# Patient Record
Sex: MALE | Race: WHITE | Employment: FULL TIME | URBAN - METROPOLITAN AREA
[De-identification: names, ages, dates, MRNs, and addresses within clinical notes are randomized per-mention and may not be internally consistent; named-entity substitution may affect disease eponyms.]

---

## 2021-08-09 ENCOUNTER — HOSPITAL ENCOUNTER (OUTPATIENT)
Dept: PHYSICAL THERAPY | Age: 31
Discharge: HOME | End: 2021-08-09
Payer: COMMERCIAL

## 2021-08-09 PROCEDURE — 97161 PT EVAL LOW COMPLEX 20 MIN: CPT

## 2021-08-09 PROCEDURE — 97140 MANUAL THERAPY 1/> REGIONS: CPT

## 2021-08-09 PROCEDURE — 97535 SELF CARE MNGMENT TRAINING: CPT

## 2021-08-09 NOTE — PROGRESS NOTES
[facility] PHYSICAL THERAPY  [address] [place] [address] [address],[place], [facility] - Phone: [phone]  Fax: [phone] / 7543 [facility]  Patient Name: Jayla Fonseca : 1990   Medical   Diagnosis: Right ankle pain [M25.571] Treatment Diagnosis: Right Ankle Sprain   Onset Date: 2021     Referral Source: Patricia Sinha MD Start of Care [facility]): 2021   Prior Hospitalization: See medical history Provider #: [id number]   Prior Level of Function: No hx ankle sprain/pain with sports   Comorbidities: None reported   Medications: Verified on Patient Summary List   The Plan of Care and following information is based on the information from the initial evaluation.   ===========================================================================================  Assessment / matias information:  Jayla Fonseca is a 33 y.o.  yo male with Dx of Right ankle pain [M25.571]. Patient reports onset of symptoms of right ankle sprain while playing kickball. Patient reports sprain of ATFL, CFL and tendinitis of posterior tibialis. Xrays negative, no MRI to assess soft tissue damage. Patient currently rates pain as 8/10 at worst, 2/10 at best, primarily located at medial, lateral, dorsal and plantar right foot and ankle. Patient did not wear CAM boot. Currently donning right ankle brace with medial and lateral stays. Patient complains of difficulty and increase pain with running, sprinting and lateral movements. Patient goals to return to competitive kickball. Objective Findings:  Right ankle AROM: DF=-5 degrees, PF 56 degrees, IVR=14 degrees, EVR=25, hallux DF 60 degrees, hallux PF=20 degrees . Manual Muscle Testin/5 right ankle strength. Special Test: TTP right post tib. Decreased mobility midfoot, talocrural joint and lateral malleolus. Pain with repeated resisted inversion. FOTO Score= 61 points.   Patient encouraged to discontinue brace use with ADLs without pain. Pt instructed in HEP and will f/u in clinic for PT.  ===========================================================================================  Eval Complexity: History HIGH Complexity :3+ comorbidities / personal factors will impact the outcome/ POC ;  Examination  HIGH Complexity : 4+ Standardized tests and measures addressing body structure, function, activity limitation and / or participation in recreation ; Presentation LOW Complexity : Stable, uncomplicated ;  Decision Making MEDIUM Complexity : FOTO score of 26-74; Overall Complexity LOW   Problem List: pain affecting function, decrease ROM, decrease strength, edema affecting function, impaired gait/ balance and decrease flexibility/ joint mobility   Treatment Plan may include any combination of the following: Therapeutic exercise, Therapeutic activities, Neuromuscular re-education, Physical agent/modality, Gait/balance training, Manual therapy and Patient education  Patient / Family readiness to learn indicated by: asking questions, trying to perform skills and interest  Persons(s) to be included in education: patient (P)  Barriers to Learning/Limitations: None  Measures taken, if barriers to learning:    Patient Goal (s): \"Get back to playing sport full mobility, full speed. \"   Patient self reported health status: excellent  Rehabilitation Potential: good   Short Term Goals: To be accomplished in  4  weeks:  1. Patient will increase FOTO Score to 72 points to improve tolerance to running. 2. Patient will achieve +2 on GROC to improve tolerance to running. 3. Patient will report 2/10 pain at worst to improve tolerance to running.  Long Term Goals: To be accomplished in  8  weeks:  1. Patient will increase FOTO Score to 83 points to return to kickball. 2. Patient will achieve full AROM right ankle to return to kickball.   3. Patient will report no pain with running, sprinting and cutting movements to return to kickball. Frequency / Duration:   Patient to be seen  2  times per week for 6-8  weeks:  Patient / Caregiver education and instruction: exercises  Therapist Signature: Sohan Chamberlain PT Date: 4/2/6387   Certification Period: n/a Time: 4:26 PM   ===========================================================================================  I certify that the above Physical Therapy Services are being furnished while the patient is under my care. I agree with the treatment plan and certify that this therapy is necessary. Physician Signature:        Date:       Time:                                        Brock Gusman MD  Please sign and return to [facility]. or you may fax the signed copy to [phone]. Thank you.

## 2021-08-09 NOTE — PROGRESS NOTES
PHYSICAL THERAPY - DAILY TREATMENT NOTE      Patient Name: Dayne Camrona        Date: 2021  : 1990   YES Patient  Verified  Visit #:     Insurance: Payor: Gayle Clemente / Plan: Mari Goodwin / Product Type: HMO /      In time: 3:35 Out time: 4:20   Total Treatment Time: 45     Medicare Time/BCBS Tracking (below)   Total Timed Codes (min):  25 1:1 Treatment Time:  25     TREATMENT AREA = Right ankle pain [M25.571]     SUBJECTIVE    Pain Level (on 0 to 10 scale):  4  / 10   Medication Changes/New allergies or changes in medical history, any new surgeries or procedures?     NO    If yes, update Summary List   Subjective Functional Status/Changes:  []  No changes reported       See Plan of Care       OBJECTIVE  Modalities Rationale:     decrease pain to improve patient's ability to walk   min [] Estim, type/location:                                      []  att     []  unatt     []  w/US     []  w/ice    []  w/heat    min []  Mechanical Traction: type/lbs                   []  pro   []  sup   []  int   []  cont    []  before manual    []  after manual    min []  Ultrasound, settings/location:      min []  Iontophoresis w/ dexamethasone, location:                                               []  take home patch       []  in clinic   10 min [x]  Ice     []  Heat    location/position: Supine right ankle    min []  Vasopneumatic Device, press/temp:        min []  Other:    [x] Skin assessment post-treatment (if applicable):    []  intact    [x]  redness- no adverse reaction                  []redness  adverse reaction:      10 min Therapeutic Exercise:  [x]  See flow sheet   Rationale:      increase ROM and increase strength to improve the patients ability to walk     15 min Manual Therapy: Technique:      [] S/DTM []IASTM []PROM [] Passive Stretching   []manual TPR    []Jt manipulation:Gr I [] II []  III [] IV[] V[]  Treatment Area:  Grade IV joint mobs mid foot, talocrural, post glides right lateral malleolus; DF and PF PROM   Rationale:      decrease pain, increase ROM and increase tissue extensibility to improve patient's ability to walk    Billed With/As:   [x] TE   [] TA   [] Neuro   [] Self Care Patient Education: [x] Review HEP    [] Progressed/Changed HEP based on:   [] positioning   [] body mechanics   [] transfers   [] heat/ice application    [] other:      Other Objective/Functional Measures:    See Plan of Care     Post Treatment Pain Level (on 0 to 10) scale:   4 / 10     ASSESSMENT    Assessment/Changes in Function:     See Plan of Care     []  See Progress Note/Recertification   Patient will continue to benefit from skilled PT services to modify and progress therapeutic interventions, address functional mobility deficits, address ROM deficits, address strength deficits, analyze and address soft tissue restrictions, analyze and cue movement patterns, analyze and modify body mechanics/ergonomics and assess and modify postural abnormalities to attain remaining goals. to attain remaining goals.    Progress toward goals / Updated goals:    See Plan of Care     PLAN    [x]  Upgrade activities as tolerated YES Continue plan of care   []  Discharge due to :    []  Other:      Therapist: Rebeca Stevens PT    Date: 8/9/2021 Time: 4:30 PM     Future Appointments   Date Time Provider Bairon Eubanks   8/12/2021  8:30 AM LUCIEN Jolly [id number]   8/18/2021 11:15 AM [facility] [address] [facility]   8/23/2021  1:00 PM [address] [facility]   8/25/2021 11:15 AM [facility] [address] [place] [facility]   8/31/2021  1:45 PM LUCIEN Jolly [address]

## 2021-08-12 ENCOUNTER — HOSPITAL ENCOUNTER (OUTPATIENT)
Dept: PHYSICAL THERAPY | Age: 31
Discharge: HOME | End: 2021-08-12
Payer: COMMERCIAL

## 2021-08-12 PROCEDURE — 97140 MANUAL THERAPY 1/> REGIONS: CPT

## 2021-08-12 PROCEDURE — 97110 THERAPEUTIC EXERCISES: CPT

## 2021-08-12 NOTE — PROGRESS NOTES
PHYSICAL THERAPY - DAILY TREATMENT NOTE    Patient Name: Keaton Osorio        Date: 2021  : 1990   yes Patient  Verified  Visit #:      18  Insurance: Payor: Cherry Hatchet / Plan: Fredy Lara / Product Type: HMO /      In time: 830 Out time: 690   Total Treatment Time: 51     Medicare/BCBS Time Tracking (below)   Total Timed Codes (min):  41 1:1 Treatment Time:  41     TREATMENT AREA =  Right ankle pain [M25.571]    SUBJECTIVE  Pain Level (on 0 to 10 scale):  4  / 10   Medication Changes/New allergies or changes in medical history, any new surgeries or procedures?    no  If yes, update Summary List   Subjective Functional Status/Changes:  []  No changes reported     Patient reports his ankle always feels worse first thing in the morning. Patient states he did his home exercises from the IE without complication. OBJECTIVE  Modalities Rationale:     decrease edema, decrease inflammation and decrease pain to improve patient's ability to perform transfers.     min [] Estim, type/location:                                      []  att     []  unatt     []  w/US     []  w/ice    []  w/heat    min []  Mechanical Traction: type/lbs                   []  pro   []  sup   []  int   []  cont    []  before manual    []  after manual    min []  Ultrasound, settings/location:      min []  Iontophoresis w/ dexamethasone, location:                                               []  take home patch       []  in clinic   10 min [x]  Ice     []  Heat    location/position:     min []  Vasopneumatic Device, press/temp:    If using vaso (only need to measure limb vaso being performed on)      pre-treatment girth :       post-treatment girth :       measured at (landmark location) :      min []  Other:    [] Skin assessment post-treatment (if applicable):    []  intact    []  redness- no adverse reaction                  []redness  adverse reaction:      29 min Therapeutic Exercise:  [x]  See flow sheet Rationale:      increase ROM, increase strength, improve coordination and improve balance to improve the patients ability to perform walking activities. 12 min Manual Therapy: STM to R posterior tib, R mid foot mobs, R talus mobs; R ankle DF stretch   Rationale:      decrease pain, increase ROM, increase tissue extensibility and decrease trigger points to improve patient's ability to perform standing activities. The manual therapy interventions were performed at a separate and distinct time from the therapeutic activities interventions. Billed With/As:   [x] TE   [] TA   [] Neuro   [] Self Care Patient Education: [x] Review HEP    [] Progressed/Changed HEP based on:   [] positioning   [] body mechanics   [] transfers   [] heat/ice application    [] other:      Other Objective/Functional Measures:    Progressed therapy program per flowsheet in order to improve R ankle strength, stability and flexibility  Tenderness noted throughout posterior tib during MT; limited ankle DF noted with passive stretching  Good technique with lateral tap down exercise, appropriate hip hinge mechanics and maintenance of LE alignment   Post Treatment Pain Level (on 0 to 10) scale:   3-4  / 10     ASSESSMENT  Assessment/Changes in Function:     Patient denied significant changes in symptoms post session. Educated on possible increase in soreness and how to respond appropriately. Also educated patient on avoiding running and sprinting activities at this time. []  See Progress Note/Recertification   Patient will continue to benefit from skilled PT services to modify and progress therapeutic interventions, address functional mobility deficits, address ROM deficits, address strength deficits, analyze and address soft tissue restrictions, analyze and cue movement patterns, analyze and modify body mechanics/ergonomics and assess and modify postural abnormalities to attain remaining goals.    Progress toward goals / Updated goals:     No progress with STG#3 at this time     PLAN  [x]  Upgrade activities as tolerated yes Continue plan of care   []  Discharge due to :    []  Other:      Therapist: Jose Hurst PT    Date: 8/12/2021 Time: 11:14 AM     Future Appointments   Date Time Provider Bairon Eubanks   8/18/2021 11:15 AM [address] [place] [address] [facility]   8/23/2021  1:00 PM 1000 [facility] Se [address] [place] [address] [facility]   8/25/2021 11:15 AM [facility] PT [facility]   8/31/2021  1:45 PM Louisa Painting, PT [address]

## 2021-08-18 ENCOUNTER — HOSPITAL ENCOUNTER (OUTPATIENT)
Dept: PHYSICAL THERAPY | Age: 31
Discharge: HOME | End: 2021-08-18
Payer: COMMERCIAL

## 2021-08-18 PROCEDURE — 97140 MANUAL THERAPY 1/> REGIONS: CPT

## 2021-08-18 PROCEDURE — 97110 THERAPEUTIC EXERCISES: CPT

## 2021-08-18 NOTE — PROGRESS NOTES
PHYSICAL THERAPY - DAILY TREATMENT NOTE      Patient Name: Kristie Coy        Date: 2021  : 1990   YES Patient  Verified  Visit #:   3   of   12  Insurance: Payor: Aleksandar Arzate / Plan: Carolyn Fee / Product Type: HMO /      In time: 11:15 Out time: 12:10   Total Treatment Time: 55     Medicare/BCBS Time Tracking (below)   Total Timed Codes (min):  45 1:1 Treatment Time:  45     TREATMENT AREA =  Right ankle pain [M25.571]    SUBJECTIVE    Pain Level (on 0 to 10 scale):  4  / 10   Medication Changes/New allergies or changes in medical history, any new surgeries or procedures?     NO    If yes, update Summary List   Subjective Functional Status/Changes:  []  No changes reported       Functional improvements: none reported  Functional impairments: running, cutting         OBJECTIVE  Modalities Rationale:     decrease pain to improve patient's ability to run   min [] Estim, type/location:                                      []  att     []  unatt     []  w/US     []  w/ice    []  w/heat    min []  Mechanical Traction: type/lbs                   []  pro   []  sup   []  int   []  cont    []  before manual    []  after manual    min []  Ultrasound, settings/location:      min []  Iontophoresis w/ dexamethasone, location:                                               []  take home patch       []  in clinic   10 min [x]  Ice     []  Heat    location/position: Supine right ankle    min []  Vasopneumatic Device, press/temp:        min []  Other:    [x] Skin assessment post-treatment (if applicable):    []  intact    [x]  redness- no adverse reaction                  []redness  adverse reaction:      30 min Therapeutic Exercise:  [x]  See flow sheet   Rationale:      increase ROM and increase strength to improve the patients ability to run     15 min Manual Therapy: Technique:      [] S/DTM []IASTM []PROM [] Passive Stretching   []manual TPR    []Jt manipulation:Gr I [] II []  III [] IV[] V[]  Treatment Area:  Right ankle effleurage to reduce edema, DF mobs grade IV; midfoot mobs grade IV   Rationale:      decrease pain, increase ROM and increase tissue extensibility to improve patient's ability to run    Billed With/As:   [x] TE   [] TA   [] Neuro   [] Self Care Patient Education: [x] Review HEP    [] Progressed/Changed HEP based on:   [] positioning   [] body mechanics   [] transfers   [] heat/ice application    [] other:      Other Objective/Functional Measures:    Continuing to focus on low pain therex to improve ankle proprioception, coordination and stability. Post Treatment Pain Level (on 0 to 10) scale:   3  / 10     ASSESSMENT    Assessment/Changes in Function:     Patient reported increased right ankle pain and edema after kickball game on 8/16/21. Reviewed recommendations to avoid exacerbating activities to promote injury prevention and improved tissues healing. []  See Progress Note/Recertification   Patient will continue to benefit from skilled PT services to modify and progress therapeutic interventions, address functional mobility deficits, address ROM deficits, address strength deficits, analyze and address soft tissue restrictions, analyze and cue movement patterns, analyze and modify body mechanics/ergonomics and assess and modify postural abnormalities to attain remaining goals. to attain remaining goals. Progress toward goals / Updated goals:    Fair Progress to    [x] STG    [] LTG  3 as shown by pain 4/10.      PLAN    [x]  Upgrade activities as tolerated YES Continue plan of care   []  Discharge due to :    []  Other:      Therapist: Aram Montelongo, PT    Date: 8/18/2021 Time: 11:49 AM     Future Appointments   Date Time Provider Bairon Eubanks   8/23/2021  1:00 PM 1000 [facility] 3 [facility]   8/25/2021 11:15 AM [facility] PT [facility] 3 [address]   8/31/2021  1:45 PM Hannah Fonseca PT [address]

## 2021-08-23 ENCOUNTER — HOSPITAL ENCOUNTER (OUTPATIENT)
Dept: PHYSICAL THERAPY | Age: 31
Discharge: HOME | End: 2021-08-23
Payer: COMMERCIAL

## 2021-08-23 PROCEDURE — 97110 THERAPEUTIC EXERCISES: CPT

## 2021-08-23 PROCEDURE — 97140 MANUAL THERAPY 1/> REGIONS: CPT

## 2021-08-23 NOTE — PROGRESS NOTES
PHYSICAL THERAPY - DAILY TREATMENT NOTE      Patient Name: Aryan Vu        Date: 2021  : 1990   YES Patient  Verified  Visit #:     Insurance: Payor: Alexis Ritchie / Plan: José Luis Feldman / Product Type: HMO /      In time: 1:00 Out time: 2:00   Total Treatment Time: 60     Medicare/[facility] Time Tracking (below)   Total Timed Codes (min):  50 1:1 Treatment Time:  50     TREATMENT AREA =  Right ankle pain [M25.571]    SUBJECTIVE    Pain Level (on 0 to 10 scale):  2  / 10   Medication Changes/New allergies or changes in medical history, any new surgeries or procedures?     NO    If yes, update Summary List   Subjective Functional Status/Changes:  []  No changes reported       Functional improvements: less pain walking  Functional impairments: running, kickball         OBJECTIVE  Modalities Rationale:     decrease pain to improve patient's ability to run   min [] Estim, type/location:                                      []  att     []  unatt     []  w/US     []  w/ice    []  w/heat    min []  Mechanical Traction: type/lbs                   []  pro   []  sup   []  int   []  cont    []  before manual    []  after manual    min []  Ultrasound, settings/location:      min []  Iontophoresis w/ dexamethasone, location:                                               []  take home patch       []  in clinic   10 min [x]  Ice     []  Heat    location/position: Supine right ankle    min []  Vasopneumatic Device, press/temp:        min []  Other:    [] Skin assessment post-treatment (if applicable):    []  intact    []  redness- no adverse reaction                  []redness  adverse reaction:      35 min Therapeutic Exercise:  [x]  See flow sheet   Rationale:      increase ROM, increase strength, improve coordination, improve balance and increase proprioception to improve the patients ability to run     15 min Manual Therapy: Technique:      [] S/DTM []IASTM []PROM [] Passive Stretching   []manual TPR    []Jt manipulation:Gr I [] II []  III [] IV[] V[]  Treatment Area:  effleruage right ankle, DF and midfoot mobs grade IV, post glide lat malleolus, DTM proximal post tib   Rationale:      decrease pain, increase ROM and increase tissue extensibility to improve patient's ability to run    Billed With/As:   [x] TE   [] TA   [] Neuro   [] Self Care Patient Education: [x] Review HEP    [] Progressed/Changed HEP based on:   [] positioning   [] body mechanics   [] transfers   [] heat/ice application    [] other:      Other Objective/Functional Measures:    Patient able to tolerated therex progression without increased pain, however unbale to squat pain free and with equal weightbearing due to limited DF ROM/pain. Post Treatment Pain Level (on 0 to 10) scale:   2  / 10     ASSESSMENT    Assessment/Changes in Function:     Patient continues to report      []  See Progress Note/Recertification   Patient will continue to benefit from skilled PT services to modify and progress therapeutic interventions, address functional mobility deficits, address ROM deficits, address strength deficits, analyze and address soft tissue restrictions, analyze and cue movement patterns, analyze and modify body mechanics/ergonomics and assess and modify postural abnormalities to attain remaining goals. to attain remaining goals. Progress toward goals / Updated goals:    Fair Progress to    [x] STG    [] LTG  3 as shown by pain 2/10.      PLAN    [x]  Upgrade activities as tolerated YES Continue plan of care   []  Discharge due to :    []  Other:      Therapist: Valeire Negrete, PT    Date: 8/23/2021 Time: 1:29 PM     Future Appointments   Date Time Provider Bairon Eubanks   8/25/2021 11:15 AM [address] [facility]   8/31/2021  1:45 PM Fabiola Stoner PT [facility]

## 2021-08-25 ENCOUNTER — HOSPITAL ENCOUNTER (OUTPATIENT)
Dept: PHYSICAL THERAPY | Age: 31
Discharge: HOME | End: 2021-08-25
Payer: COMMERCIAL

## 2021-08-25 PROCEDURE — 97140 MANUAL THERAPY 1/> REGIONS: CPT

## 2021-08-25 PROCEDURE — 97110 THERAPEUTIC EXERCISES: CPT

## 2021-08-25 NOTE — PROGRESS NOTES
PHYSICAL THERAPY - DAILY TREATMENT NOTE      Patient Name: Ivelisse Porter        Date: 2021  : 1990   YES Patient  Verified  Visit #:     Insurance: Payor: Leah Chase / Plan: Allen Carbon / Product Type: HMO /      In time: 11:15 Out time: 12:15   Total Treatment Time: 60     Medicare/[facility] Time Tracking (below)   Total Timed Codes (min):  50 1:1 Treatment Time:  50     TREATMENT AREA =  Right ankle pain [M25.571]    SUBJECTIVE    Pain Level (on 0 to 10 scale):  4  / 10   Medication Changes/New allergies or changes in medical history, any new surgeries or procedures?     NO    If yes, update Summary List   Subjective Functional Status/Changes:  []  No changes reported       Functional improvements: pain free daily activities   Functional impairments: running, kickball         OBJECTIVE  Modalities Rationale:     decrease pain to improve patient's ability to run   min [] Estim, type/location:                                      []  att     []  unatt     []  w/US     []  w/ice    []  w/heat    min []  Mechanical Traction: type/lbs                   []  pro   []  sup   []  int   []  cont    []  before manual    []  after manual    min []  Ultrasound, settings/location:      min []  Iontophoresis w/ dexamethasone, location:                                               []  take home patch       []  in clinic   10 min [x]  Ice     []  Heat    location/position: Supine right ankle    min []  Vasopneumatic Device, press/temp:        min []  Other:    [x] Skin assessment post-treatment (if applicable):    []  intact    [x]  redness- no adverse reaction                  []redness  adverse reaction:      35 min Therapeutic Exercise:  [x]  See flow sheet   Rationale:      increase strength, improve coordination, improve balance and increase proprioception to improve the patients ability to run     15 min Manual Therapy: Technique:      [] S/DTM []IASTM []PROM [] Passive Stretching   []manual TPR    []Jt manipulation:Gr I [] II []  III [] IV[] V[]  Treatment Area:  STM post tib; joint mobs grade IV DF, midfoot   Rationale:      decrease pain, increase ROM and increase tissue extensibility to improve patient's ability to run    Billed With/As:   [] TE   [] TA   [] Neuro   [] Self Care Patient Education: [x] Review HEP    [] Progressed/Changed HEP based on:   [] positioning   [] body mechanics   [] transfers   [] heat/ice application    [] other:      Other Objective/Functional Measures:    Patient reporting increased pain with therex today after playing kickball on 8/23/21. Post Treatment Pain Level (on 0 to 10) scale:   3  / 10     ASSESSMENT    Assessment/Changes in Function:     Continuing to focus on low pain stability exercises to improve right ankle stability. []  See Progress Note/Recertification   Patient will continue to benefit from skilled PT services to modify and progress therapeutic interventions, address functional mobility deficits, address ROM deficits, address strength deficits, analyze and address soft tissue restrictions, analyze and cue movement patterns, analyze and modify body mechanics/ergonomics and assess and modify postural abnormalities to attain remaining goals. to attain remaining goals. Progress toward goals / Updated goals:    Fair Progress to    [x] STG    [] LTG  3 as shown by pain 4/10.      PLAN    [x]  Upgrade activities as tolerated YES Continue plan of care   []  Discharge due to :    []  Other:      Therapist: Young Marcano PT    Date: 8/25/2021 Time: 11:57 AM     Future Appointments   Date Time Provider Bairon Eubanks   8/31/2021  1:45 PM LUCIEN Berger [id number]

## 2021-08-31 ENCOUNTER — HOSPITAL ENCOUNTER (OUTPATIENT)
Dept: PHYSICAL THERAPY | Age: 31
Discharge: HOME | End: 2021-08-31
Payer: COMMERCIAL

## 2021-08-31 PROCEDURE — 97112 NEUROMUSCULAR REEDUCATION: CPT

## 2021-08-31 PROCEDURE — 97110 THERAPEUTIC EXERCISES: CPT

## 2021-08-31 PROCEDURE — 97140 MANUAL THERAPY 1/> REGIONS: CPT

## 2021-08-31 NOTE — PROGRESS NOTES
PHYSICAL THERAPY - DAILY TREATMENT NOTE    Patient Name: Alexandra Barnard        Date: 2021  : 1990   yes Patient  Verified  Visit #:     Insurance: Payor: Darren Roche / Plan: Donna Javier / Product Type: HMO /      In time: 145 Out time: 238   Total Treatment Time: 48     Medicare/[employer] Time Tracking (below)   Total Timed Codes (min):  38 1:1 Treatment Time:  38     TREATMENT AREA =  Right ankle pain [M25.571]    SUBJECTIVE  Pain Level (on 0 to 10 scale):  3  / 10   Medication Changes/New allergies or changes in medical history, any new surgeries or procedures?    no  If yes, update Summary List   Subjective Functional Status/Changes:  []  No changes reported     Patient reports his ankle was sore after playing kickball, but overall hasn't experienced any worsening of symptoms. OBJECTIVE  Modalities Rationale:     decrease inflammation and decrease pain to improve patient's ability to perform transfers.     min [] Estim, type/location:                                      []  att     []  unatt     []  w/US     []  w/ice    []  w/heat    min []  Mechanical Traction: type/lbs                   []  pro   []  sup   []  int   []  cont    []  before manual    []  after manual    min []  Ultrasound, settings/location:      min []  Iontophoresis w/ dexamethasone, location:                                               []  take home patch       []  in clinic   10 min [x]  Ice     []  Heat    location/position: To R ankle in supine with bolster    min []  Vasopneumatic Device, press/temp:    If using vaso (only need to measure limb vaso being performed on)      pre-treatment girth :       post-treatment girth :       measured at (landmark location) :      min []  Other:    [] Skin assessment post-treatment (if applicable):    []  intact    []  redness- no adverse reaction                  []redness  adverse reaction:      18 min Therapeutic Exercise:  [x]  See flow sheet   Rationale: increase ROM and increase strength to improve the patients ability to perform walking activities. 10 min Manual Therapy: STM to R posterior tib, R midfoot mobs, R talar mobs   Rationale:      decrease pain, increase ROM, increase tissue extensibility and decrease trigger points to improve patient's ability to perform standing activities. The manual therapy interventions were performed at a separate and distinct time from the therapeutic activities interventions. 10 min Neuromuscular Re-ed: [x]  See flow sheet   Rationale:    improve coordination, improve balance and increase proprioception to improve the patients ability to perform recreational activities. Billed With/As:   [x] TE   [] TA   [] Neuro   [] Self Care Patient Education: [x] Review HEP    [] Progressed/Changed HEP based on:   [] positioning   [] body mechanics   [] transfers   [] heat/ice application    [] other:      Other Objective/Functional Measures: Added SL KB around the world and diagonal lifts this session for improved neuromuscular control  Added SL ball toss with emphasis on catching outside of base of support to challenge R ankle stability     Post Treatment Pain Level (on 0 to 10) scale:   0  / 10     ASSESSMENT  Assessment/Changes in Function:     Patient denied increased pain with progression of today's program. Educated patient that playing in [employer] games at this time is likely setting him back and delaying time until full recovery. Patient acknowledged understanding. []  See Progress Note/Recertification   Patient will continue to benefit from skilled PT services to modify and progress therapeutic interventions, address functional mobility deficits, address ROM deficits, address strength deficits, analyze and address soft tissue restrictions, analyze and cue movement patterns, analyze and modify body mechanics/ergonomics and assess and modify postural abnormalities to attain remaining goals.    Progress toward goals / Updated goals:    Slow progress with long term pain reduction     PLAN  [x]  Upgrade activities as tolerated yes Continue plan of care   []  Discharge due to :    []  Other:      Therapist: Pedro Mcgregor PT    Date: 8/31/2021 Time: 3:07 PM     Future Appointments   Date Time Provider Bairon Eubanks   9/13/2021 11:00 AM [address] [place] [address] [facility]   9/15/2021 11:00 AM [facility] PT [facility] 3 [facility]   9/20/2021 11:00 AM [address] [place] [address] [place] [address]

## 2021-09-13 ENCOUNTER — APPOINTMENT (OUTPATIENT)
Dept: PHYSICAL THERAPY | Age: 31
End: 2021-09-13
Payer: COMMERCIAL

## 2021-09-15 ENCOUNTER — APPOINTMENT (OUTPATIENT)
Dept: PHYSICAL THERAPY | Age: 31
End: 2021-09-15
Payer: COMMERCIAL

## 2021-09-20 ENCOUNTER — HOSPITAL ENCOUNTER (OUTPATIENT)
Dept: PHYSICAL THERAPY | Age: 31
Discharge: HOME | End: 2021-09-20
Payer: COMMERCIAL

## 2021-09-20 PROCEDURE — 97110 THERAPEUTIC EXERCISES: CPT

## 2021-09-20 PROCEDURE — 97140 MANUAL THERAPY 1/> REGIONS: CPT

## 2021-09-20 NOTE — PROGRESS NOTES
[address] [place] [address] THERAPY  [address], [address] [facility] - Phone: [phone]  Fax: [phone]  PROGRESS NOTE  Patient Name: Evelina Carpenter : 1990   Treatment/Medical Diagnosis: Right ankle pain [M25.571]   Referral Source: Danielle Harkins MD     Date of Initial Visit: 21 Attended Visits: 7 Missed Visits: 1     SUMMARY OF TREATMENT  Treatment focused on manual therapy and therex to improve right ankle pain and stability. CURRENT STATUS  Patient progressing slowly with GROC of +4 reporting \"moderately better. \" Pain currently point specific along the right posterior tibialis tendon with mild edema present medial and lateral right ankle. Pain 1-2/10 at worst with ADLs and 4/10 at worst during and after kickball and high impact movements. Patient utilizing ankle brace while playing to reduce pain and improve stability. Right ankle AROM: DF 5 degrees, PF 60 degrees, INV 14 degrees, EVR 28 degrees. Patient educated in the benefits of reducing high impact activities to promote tissue healing. Plan to continue to progress stability therex to improve pain and performance during sport. Goal/Measure of Progress Goal Met? 1.  FOTO Score 83 points   Status at last Eval: 61 points Current Status: 67 points no   2. Full right ankle AROM    Status at last Eval:  Right ankle AROM: DF=-5 degrees, PF 56 degrees, IVR=14 degrees, EVR=25, hallux DF 60 degrees, hallux PF=20 degrees  Current Status:  Right ankle AROM: DF 5 degrees, PF 60 degrees, INV 14 degrees, EVR 28 degrees no   3. No pain with running, sprinting, cutting   Status at last Eval: Pain 8/10 at worst Current Status: Pain 4/10 at worst no     New Goals to be achieved in __4__  weeks:  1. Patient will increase FOTO Score to 83 points to return to kickball. 2. Patient will achieve full AROM right ankle to return to kickball.   3. Patient will report no pain with running, sprinting and cutting movements to return to kickball. RECOMMENDATIONS  Patient would benefit from continued skilled PT services 1-2 x week x 4 weeks to further improve right ankle pain and stability. If you have any questions/comments please contact us directly at [phone]. Thank you for allowing us to assist in the care of your patient. Therapist Signature: Mahnaz Lei PT Date: 9/20/2021     Time: 11:48 AM   NOTE TO PHYSICIAN:  PLEASE COMPLETE THE ORDERS BELOW AND FAX TO   [facility]: [phone]  If you are unable to process this request in 24 hours please contact our office: [phone]    ___ I have read the above report and request that my patient continue as recommended.   ___ I have read the above report and request that my patient continue therapy with the following changes/special instructions:_________________________________________________________   ___ I have read the above report and request that my patient be discharged from therapy.      Physician Signature:        Date:       Time:                                 Serge Bishop MD

## 2021-09-20 NOTE — PROGRESS NOTES
PHYSICAL THERAPY - DAILY TREATMENT NOTE      Patient Name: Renita Soria        Date: 2021  : 1990   YES Patient  Verified  Visit #:     Insurance: Payor: Abe Negro / Plan: Pako Vega / Product Type: HMO /      In time: 11:00 Out time: 12:00   Total Treatment Time: 60     Medicare/[employer] Time Tracking (below)   Total Timed Codes (min):  50 1:1 Treatment Time:  50     TREATMENT AREA =  Right ankle pain [M25.571]    SUBJECTIVE    Pain Level (on 0 to 10 scale):  3  / 10   Medication Changes/New allergies or changes in medical history, any new surgeries or procedures?     NO    If yes, update Summary List   Subjective Functional Status/Changes:  []  No changes reported       Functional improvements: less pain with ADLs  Functional impairments: pain with kickball         OBJECTIVE  Modalities Rationale:     decrease pain to improve patient's ability to run   min [] Estim, type/location:                                      []  att     []  unatt     []  w/US     []  w/ice    []  w/heat    min []  Mechanical Traction: type/lbs                   []  pro   []  sup   []  int   []  cont    []  before manual    []  after manual    min []  Ultrasound, settings/location:      min []  Iontophoresis w/ dexamethasone, location:                                               []  take home patch       []  in clinic   10 min [x]  Ice     []  Heat    location/position: Supine right ankle    min []  Vasopneumatic Device, press/temp:        min []  Other:    [x] Skin assessment post-treatment (if applicable):    []  intact    [x]  redness- no adverse reaction                  []redness  adverse reaction:      35 min Therapeutic Exercise:  [x]  See flow sheet   Rationale:      increase ROM and increase strength to improve the patients ability to run     15 min Manual Therapy: Technique:      [] S/DTM []IASTM []PROM [] Passive Stretching   []manual TPR    []Jt manipulation:Gr I [] II []  III [] IV[] V[]  Treatment Area:  Right ankle and midfoot joint mobs grade IV; DTM right posterior tib; effleurage right ankle to manage mild edema   Rationale:      decrease pain, increase ROM and increase tissue extensibility to improve patient's ability to run    Billed With/As:   [x] TE   [] TA   [] Neuro   [] Self Care Patient Education: [x] Review HEP    [] Progressed/Changed HEP based on:   [] positioning   [] body mechanics   [] transfers   [] heat/ice application    [] other:      Other Objective/Functional Measures:    See PN     Post Treatment Pain Level (on 0 to 10) scale:   0  / 10     ASSESSMENT    Assessment/Changes in Function:     See PN     []  See Progress Note/Recertification   Patient will continue to benefit from skilled PT services to modify and progress therapeutic interventions, address functional mobility deficits, address ROM deficits, address strength deficits, analyze and address soft tissue restrictions, analyze and cue movement patterns, analyze and modify body mechanics/ergonomics and assess and modify postural abnormalities to attain remaining goals. to attain remaining goals.    Progress toward goals / Updated goals:    See PN     PLAN    [x]  Upgrade activities as tolerated YES Continue plan of care   []  Discharge due to :    []  Other:      Therapist: Tiki Roberto PT    Date: 9/20/2021 Time: 11:44 AM     Future Appointments   Date Time Provider Bairon Eubanks   9/22/2021  1:30 PM 1000 [facility] [address]

## 2021-09-22 ENCOUNTER — HOSPITAL ENCOUNTER (OUTPATIENT)
Dept: PHYSICAL THERAPY | Age: 31
Discharge: HOME | End: 2021-09-22
Payer: COMMERCIAL

## 2021-09-22 PROCEDURE — 97112 NEUROMUSCULAR REEDUCATION: CPT

## 2021-09-22 PROCEDURE — 97140 MANUAL THERAPY 1/> REGIONS: CPT

## 2021-09-22 PROCEDURE — 97110 THERAPEUTIC EXERCISES: CPT

## 2021-09-22 NOTE — PROGRESS NOTES
PHYSICAL THERAPY - DAILY TREATMENT NOTE      Patient Name: Gabo Martinez        Date: 2021  : 1990   YES Patient  Verified  Visit #:     Insurance: Payor: Nba Lowery / Plan: Chiqui Najera / Product Type: HMO /      In time: 1:35 Out time: 2:25   Total Treatment Time: 50     Medicare/[employer] Time Tracking (below)   Total Timed Codes (min):  35 1:1 Treatment Time:  35     TREATMENT AREA =  Right ankle pain [M25.571]    SUBJECTIVE    Pain Level (on 0 to 10 scale):  1  / 10   Medication Changes/New allergies or changes in medical history, any new surgeries or procedures?     NO    If yes, update Summary List   Subjective Functional Status/Changes:  []  No changes reported       Functional improvements: cutting, lateral movements  Functional impairments: impulsion during first step running         OBJECTIVE  Modalities Rationale:     decrease pain to improve patient's ability to run   min [] Estim, type/location:                                      []  att     []  unatt     []  w/US     []  w/ice    []  w/heat    min []  Mechanical Traction: type/lbs                   []  pro   []  sup   []  int   []  cont    []  before manual    []  after manual    min []  Ultrasound, settings/location:      min []  Iontophoresis w/ dexamethasone, location:                                               []  take home patch       []  in clinic   10 min [x]  Ice     []  Heat    location/position: Supine right ankle    min []  Vasopneumatic Device, press/temp:        min []  Other:    [x] Skin assessment post-treatment (if applicable):    []  intact    [x]  redness- no adverse reaction                  []redness  adverse reaction:      15 min Therapeutic Exercise:  [x]  See flow sheet   Rationale:      increase ROM and increase strength to improve the patients ability to run       10 min Neuromuscular Re-ed:    Rationale:      increase ROM, increase strength and improve coordination, proprioception to improve the patients ability to run     15 min Manual Therapy: Technique:      [] S/DTM []IASTM []PROM [] Passive Stretching   []manual TPR    []Jt manipulation:Gr I [] II []  III [] IV[] V[]  Treatment Area:  Grade IV mobs DF and midfoot; DTM proximal posterior tib   Rationale:      decrease pain, increase ROM and increase tissue extensibility to improve patient's ability to run    Billed With/As:   [x] TE   [] TA   [] Neuro   [] Self Care Patient Education: [x] Review HEP    [] Progressed/Changed HEP based on:   [] positioning   [] body mechanics   [] transfers   [] heat/ice application    [] other:      Other Objective/Functional Measures: Added toe touch correction to improve functional DF AROM during squat. Post Treatment Pain Level (on 0 to 10) scale:   0  / 10     ASSESSMENT    Assessment/Changes in Function:     Patient reporting continued medial    []  See Progress Note/Recertification   Patient will continue to benefit from skilled PT services to modify and progress therapeutic interventions, address functional mobility deficits, address ROM deficits, address strength deficits, analyze and address soft tissue restrictions, analyze and cue movement patterns, analyze and modify body mechanics/ergonomics and assess and modify postural abnormalities to attain remaining goals. to attain remaining goals. Progress toward goals / Updated goals:    Fair Progress to    [] STG    [x] LTG  3 as shown by pain 1/10. PLAN    [x]  Upgrade activities as tolerated YES Continue plan of care   []  Discharge due to :    []  Other:      Therapist: Johan Jacome PT    Date: 9/22/2021 Time: 1:51 PM   No future appointments.

## 2021-09-27 ENCOUNTER — HOSPITAL ENCOUNTER (OUTPATIENT)
Dept: PHYSICAL THERAPY | Age: 31
Discharge: HOME | End: 2021-09-27
Payer: COMMERCIAL

## 2021-09-27 PROCEDURE — 97140 MANUAL THERAPY 1/> REGIONS: CPT

## 2021-09-27 PROCEDURE — 97112 NEUROMUSCULAR REEDUCATION: CPT

## 2021-09-27 PROCEDURE — 97110 THERAPEUTIC EXERCISES: CPT

## 2021-09-27 NOTE — PROGRESS NOTES
PHYSICAL THERAPY - DAILY TREATMENT NOTE      Patient Name: Jovon Holden        Date: 2021  : 1990   YES Patient  Verified  Visit #:     Insurance: Payor: Danielle Huntley / Plan: Liliana Lozano / Product Type: HMO /      In time: 1:35 Out time: 2:25   Total Treatment Time: 50     Medicare/[facility] Time Tracking (below)   Total Timed Codes (min):  40 1:1 Treatment Time:  40     TREATMENT AREA =  Right ankle pain [M25.571]    SUBJECTIVE    Pain Level (on 0 to 10 scale):  0  / 10   Medication Changes/New allergies or changes in medical history, any new surgeries or procedures?     NO    If yes, update Summary List   Subjective Functional Status/Changes:  []  No changes reported       Functional improvements: running  Functional impairments: lateral movements         OBJECTIVE  Modalities Rationale:     decrease pain to improve patient's ability to play kickball   min [] Estim, type/location:                                      []  att     []  unatt     []  w/US     []  w/ice    []  w/heat    min []  Mechanical Traction: type/lbs                   []  pro   []  sup   []  int   []  cont    []  before manual    []  after manual    min []  Ultrasound, settings/location:      min []  Iontophoresis w/ dexamethasone, location:                                               []  take home patch       []  in clinic   10 min [x]  Ice     []  Heat    location/position: Supine right ankle    min []  Vasopneumatic Device, press/temp:        min []  Other:    [x] Skin assessment post-treatment (if applicable):    []  intact    [x]  redness- no adverse reaction                  []redness  adverse reaction:      15 min Therapeutic Exercise:  [x]  See flow sheet   Rationale:      increase strength and improve coordination to improve the patients ability to play kickball     10 min Neuromuscular Re-ed:    Rationale:      increase strength and improve coordination to improve the patients ability to play kickball 15 min Manual Therapy: Technique:      [] S/DTM []IASTM []PROM [] Passive Stretching   []manual TPR    []Jt manipulation:Gr I [] II []  III [] IV[] V[]  Treatment Area:  Right DF and midfoot joint mobs grade IV; DTM plantar fascia and post tib   Rationale:      increase ROM and increase tissue extensibility to improve patient's ability to play kickball    Billed With/As:   [x] TE   [] TA   [] Neuro   [] Self Care Patient Education: [x] Review HEP    [] Progressed/Changed HEP based on:   [] positioning   [] body mechanics   [] transfers   [] heat/ice application    [] other:      Other Objective/Functional Measures:    Improved DF AROM with increased tolerance to squat jumps. Post Treatment Pain Level (on 0 to 10) scale:   0  / 10     ASSESSMENT    Assessment/Changes in Function:     Patient able to play kickball over the weekend without symptom exacerbation. []  See Progress Note/Recertification   Patient will continue to benefit from skilled PT services to modify and progress therapeutic interventions, address functional mobility deficits, address ROM deficits, address strength deficits, analyze and address soft tissue restrictions, analyze and cue movement patterns, analyze and modify body mechanics/ergonomics and assess and modify postural abnormalities to attain remaining goals. to attain remaining goals. Progress toward goals / Updated goals:    Fair Progress to    [] STG    [x] LTG  3 as shown by pain free today.      PLAN    [x]  Upgrade activities as tolerated YES Continue plan of care   []  Discharge due to :    []  Other:      Therapist: Gayle Hylton PT    Date: 9/27/2021 Time: 1:58 PM     Future Appointments   Date Time Provider Bairon Eubanks   10/4/2021  1:30 PM [address] [facility] [address] [facility] [address]

## 2021-10-04 ENCOUNTER — HOSPITAL ENCOUNTER (OUTPATIENT)
Dept: PHYSICAL THERAPY | Age: 31
Discharge: HOME | End: 2021-10-04
Payer: COMMERCIAL

## 2021-10-04 PROCEDURE — 97110 THERAPEUTIC EXERCISES: CPT

## 2021-10-04 PROCEDURE — 97140 MANUAL THERAPY 1/> REGIONS: CPT

## 2021-10-04 NOTE — PROGRESS NOTES
PHYSICAL THERAPY - DAILY TREATMENT NOTE      Patient Name: Charline Riley        Date: 10/4/2021  : 1990   YES Patient  Verified  Visit #:   10   of   12  Insurance: Payor: Fayetta Hamman / Plan: Cori Sandoval / Product Type: HMO /      In time: 3:00 Out time: 3:55   Total Treatment Time: 55     Medicare/[facility] Time Tracking (below)   Total Timed Codes (min):  45 1:1 Treatment Time:  45     TREATMENT AREA =  Right ankle pain [M25.571]    SUBJECTIVE    Pain Level (on 0 to 10 scale):  0  / 10   Medication Changes/New allergies or changes in medical history, any new surgeries or procedures?     NO    If yes, update Summary List   Subjective Functional Status/Changes:  []  No changes reported       Functional improvements: high impact push off  Functional impairments: high impact movements         OBJECTIVE  Modalities Rationale:     decrease pain to improve patient's ability to play kickball   min [] Estim, type/location:                                      []  att     []  unatt     []  w/US     []  w/ice    []  w/heat    min []  Mechanical Traction: type/lbs                   []  pro   []  sup   []  int   []  cont    []  before manual    []  after manual    min []  Ultrasound, settings/location:      min []  Iontophoresis w/ dexamethasone, location:                                               []  take home patch       []  in clinic   10 min [x]  Ice     []  Heat    location/position: Supine right ankle    min []  Vasopneumatic Device, press/temp:        min []  Other:    [x] Skin assessment post-treatment (if applicable):    [x]  intact    []  redness- no adverse reaction                  []redness  adverse reaction:      30 min Therapeutic Exercise:  [x]  See flow sheet   Rationale:      increase ROM and increase strength to improve the patients ability to play kickball     15 min Manual Therapy: Technique:      [] S/DTM []IASTM []PROM [] Passive Stretching   []manual TPR    []Jt manipulation:Gr I [] II []  III [] IV[] V[]  Treatment Area:  Right midfoot and DF mobs grade IV, DTM right plantar fascia, post tib   Rationale:      decrease pain, increase ROM and increase tissue extensibility to improve patient's ability to play kickball    Billed With/As:   [x] TE   [] TA   [] Neuro   [] Self Care Patient Education: [x] Review HEP    [] Progressed/Changed HEP based on:   [] positioning   [] body mechanics   [] transfers   [] heat/ice application    [] other:      Other Objective/Functional Measures:    Pain with single leg line hops, hold and reassess next visit. Post Treatment Pain Level (on 0 to 10) scale:   0  / 10     ASSESSMENT    Assessment/Changes in Function:     Improved tolerance to running and lateral movement while playing kickball; continued intermittent pain with high impact push off.     []  See Progress Note/Recertification   Patient will continue to benefit from skilled PT services to modify and progress therapeutic interventions, address functional mobility deficits, address ROM deficits, address strength deficits, analyze and address soft tissue restrictions, analyze and cue movement patterns, analyze and modify body mechanics/ergonomics and assess and modify postural abnormalities to attain remaining goals. to attain remaining goals. Progress toward goals / Updated goals:    Slow Progress to    [] STG    [x] LTG  3 as shown by pain free ADLs. PLAN    [x]  Upgrade activities as tolerated YES Continue plan of care   []  Discharge due to :    []  Other:      Therapist: Venice Pisano, PT    Date: 10/4/2021 Time: 3:24 PM   No future appointments.

## 2021-11-15 NOTE — PROGRESS NOTES
[address] THERAPY  [address] [facility] [address],[facility], [address] - Phone: [phone]  Fax: [phone]    DISCHARGE NOTE  Patient Name: Lynsey Mckeon : 1990   Treatment/Medical Diagnosis: Right ankle pain [M25.571]   Referral Source: Darcie Clark MD     Date of Initial Visit: 21 Attended Visits: 10 Missed Visits: 1       SUMMARY OF TREATMENT  Patient attended 10 visits with good progress toward goals. Patient reporting low levels of pain and increased performance in flag football. However patient did not return to PT to complete plan of care. Unable to formally assess goals at discharge due to lack of attendance. RECOMMENDATIONS  Discontinue physical therapy due to lack of attendance. If you have any questions/comments please contact us directly at [phone]. Thank you for allowing us to assist in the care of your patient.     Therapist Signature: Cuca Carvalho, LUCIEN Date: 11/15/21     Time: 2:29 PM